# Patient Record
Sex: FEMALE | Race: WHITE | NOT HISPANIC OR LATINO | Employment: OTHER
[De-identification: names, ages, dates, MRNs, and addresses within clinical notes are randomized per-mention and may not be internally consistent; named-entity substitution may affect disease eponyms.]

---

## 2023-11-01 ENCOUNTER — TRANSCRIBE ORDERS (OUTPATIENT)
Dept: OTHER | Age: 78
End: 2023-11-01

## 2023-11-01 DIAGNOSIS — L98.9 SKIN LESION: Primary | ICD-10-CM

## 2024-01-06 ENCOUNTER — HEALTH MAINTENANCE LETTER (OUTPATIENT)
Age: 79
End: 2024-01-06

## 2025-02-27 ENCOUNTER — TRANSCRIBE ORDERS (OUTPATIENT)
Dept: OTHER | Age: 80
End: 2025-02-27

## 2025-02-27 DIAGNOSIS — M25.50 POLYARTHRALGIA: Primary | ICD-10-CM

## 2025-03-28 PROBLEM — N81.6 CYSTOCELE WITH RECTOCELE: Status: ACTIVE | Noted: 2025-03-28

## 2025-03-28 PROBLEM — N81.10 CYSTOCELE WITH RECTOCELE: Status: ACTIVE | Noted: 2025-03-28

## 2025-06-24 NOTE — PROGRESS NOTES
Rheumatology Clinic Visit  [facility]  DEBBIE Osullivan     Bernice Mejia MRN# [id number]   YOB: 1945 Age: 79 year old   Date of Visit: 7/2/2025  Primary care provider: Lisy Case          Assessment and Plan:     1.  Polyarthralgia  2.  Primary osteoarthritis of both hands    Patient presents today for an initial evaluation.  She states that she was diagnosed with osteoarthritis many years ago.  She has been on meloxicam and has been well-controlled with that.  1 year ago in April she fell and tore her rotator cuff.  She was doing physical therapy to help.  Unfortunately after that time the meloxicam was no longer effective.  She has tried other NSAIDs such as Celebrex or piroxicam without significant relief.  She has been given prednisone which has been significantly helpful.  Yesterday she got a cortisone injection into her left shoulder and states that today she woke up without any swelling in her hands.  Physical examination today does show significant Shannon and [place]berden nodes.    Reviewed the specialty of rheumatology today.  We discussed the differences between inflammatory and noninflammatory arthritis.  She certainly has significant osteoarthritis with pretty significant changes to her joints from it.  X-rays do not show any inflammatory arthritic changes but we will do further evaluation.  Will check some blood work today.  May want to consider getting an MRI.  If everything returns normal/negative I did discuss with the patient that she could discuss Cymbalta with her primary care provider.  I will have the patient called once the results of today's workup is complete.    Plan:     Schedule follow-up with Harini Tellez PA-C depending on results of today's work up  Imaging: consider MRI of one of your joints  Labs: CCP antibody, Rheumatoid factor, CRP and Sed Rate    DEBBIE Osullivan  Rheumatology         History of Present Illness:   Bernice Mejia presents for  evaluation of joint pain.  Past medical history includes cystocele with rectocele, hyperlipidemia, hypertension and urinary incontinence.    In 1997 she was diagnosed with OA. She has been on medications over the year. 1 year ago in April she fell and tore in her rotator cuff. She started PT to help. She states that she had been on Meloxicam but it was not working, which before it worked great. She tried Prednisone. Nothing seemed to help besides the Prednisone (things tried included celebrex and piroxicam). She was given some Prednisone, ended 3 days ago. She was waking up with her hands swollen and sore. Her shoulders hurt during the night. Soreness in her feet. H/o knee replacement but still having some aching. She got a cortisone injection the left shoulder yesterday and woke up without any swollen hands. No skin rashes or mouth sores. No history of blood clots or seizures. No hair loss.  No significant dry eyes or dry mouth. No history of delirium or psychosis. No raynaud's. No trouble with swallowing food/medications.     Mother and mother with lupus.          Review of Systems:     Constitutional: negative  Skin: negative  Eyes: negative  Ears/Nose/Throat: negative  Respiratory: No shortness of breath, dyspnea on exertion, cough, or hemoptysis  Cardiovascular: negative  Gastrointestinal: negative  Genitourinary: negative  Musculoskeletal: as above  Neurologic: negative  Psychiatric: negative  Hematologic/Lymphatic/Immunologic: negative  Endocrine: negative         Active Problem List:     Patient Active Problem List    Diagnosis Date Noted    Cystocele with rectocele 03/28/2025     Priority: Medium    Hyperlipidemia 02/03/2015     Priority: Medium     From a visit with Lisy GORDON on 3/19/14--  A/p: (272.4) Other and unspecified hyperlipidemia (primary encounter diagnosis)   Comment:   Plan: due for labs, refill. Will contact with lab results when available. . Continue with low fat, high fiber diet.  Regular exercise, weight loss discussed. Continue simvastatin      Enthesopathy of hip region 09/05/2007     Priority: Medium    Osteoarthritis 08/01/2007     Priority: Medium     right-waiting for replacement      Arthropathy 08/01/2007     Priority: Medium    Essential hypertension 08/01/2007     Priority: Medium    Urinary incontinence 08/01/2007     Priority: Medium            Past Medical History:   No past medical history on file.  No past surgical history on file.         Social History:     Social History     Socioeconomic History    Marital status:      Spouse name: Not on file    Number of children: Not on file    Years of education: Not on file    Highest education level: Not on file   Occupational History    Not on file   Tobacco Use    Smoking status: Never    Smokeless tobacco: Not on file   Substance and Sexual Activity    Alcohol use: Not on file    Drug use: Not on file    Sexual activity: Not on file   Other Topics Concern    Not on file   Social History Narrative    Not on file     Social Drivers of Health     Financial Resource Strain: High Risk (1/1/2022)    Received from [facility]    Financial Resource Strain     Difficulty of Paying Living Expenses: Not on file     Difficulty of Paying Living Expenses: Not on file   Food Insecurity: Not on file   Transportation Needs: Not on file   Physical Activity: Not on file   Stress: Not on file   Social Connections: Not on file   Interpersonal Safety: Not on file   Housing Stability: Not on file          Family History:     Family History   Problem Relation Age of Onset    Lupus Mother     Diabetes Mother     Hypertension Mother     Heart Failure Mother     Diabetes Father     Heart Disease Father             Allergies:     Allergies   Allergen Reactions    Duloxetine Hcl Diarrhea and GI Disturbance    Lisinopril Cough    Quinine     Sulfa Antibiotics     Thiazide-Type Diuretics             Medications:      Current Outpatient Medications   Medication Sig Dispense Refill    Acetaminophen (TYLENOL PO) Take 325 mg by mouth every 6 hours as needed for mild pain or fever (Patient taking differently: Take 650 mg by mouth every 8 hours as needed for mild pain.)      calcium citrate-vitamin D (CITRACAL) 315-200 MG-UNIT TABS Take 1 tablet by mouth daily      Cholecalciferol (VITAMIN D3 PO) Take 2,000 Units by mouth daily      clotrimazole-betamethasone (LOTRISONE) 1-0.05 % external cream Apply topically 2 times daily.      diclofenac (VOLTAREN) 1 % topical gel Apply topically 4 times daily as needed.      estradiol (ESTRACE) 0.1 MG/GM vaginal cream Place 1 g vaginally twice a week.      glucosamine-chondroitin 500-400 MG CAPS Take 1 capsule by mouth 2 times daily.      HEMP OIL OR EXTRACT OR OTHER CBD CANNABINOID, NOT MEDICAL CANNABIS, as needed (for pain).      losartan (COZAAR) 25 MG tablet Take 75 mg by mouth daily.      MAGNESIUM PO Take 1 tablet by mouth daily as needed. (Patient taking differently: Take 250 mg by mouth daily. Magnesium citrate 250mg daily, and magnesia phospherica 6x HPUS as needed for leg cramps)      meloxicam (MOBIC) 15 MG tablet Take 15 mg by mouth daily.      metoprolol succinate ER (TOPROL XL) 50 MG 24 hr tablet Take 50 mg by mouth daily.      multivitamin, therapeutic (THERA-VIT) TABS Take 1 tablet by mouth daily      Omega-3 Fatty Acids (OMEGA-3 FISH OIL PO) Take 1 g by mouth      Pseudoephedrine HCl (SUDAFED PO) Take 60 mg by mouth every 6 hours as needed for congestion      SIMVASTATIN PO Take 40 mg by mouth At Bedtime      sodium chloride (OCEAN) 0.65 % nasal spray Spray 1 spray into both nostrils daily as needed for congestion      trimethoprim (TRIMPEX) 100 MG tablet Take 100 mg by mouth daily.      Turmeric 400 MG CAPS Take by mouth daily.              Physical Exam:   Blood pressure (!) 151/68, pulse 92, temperature 98  F (36.7  C), temperature source Tympanic, resp. rate 12, height  "1.626 m (5' 4\"), weight 87.1 kg (192 lb), SpO2 94%.  Wt Readings from Last 6 Encounters:   07/20/15 97.1 kg (214 lb)     Constitutional: well-developed, appearing stated age; cooperative  Eyes: nl PERRLA, conjunctiva, sclera  ENT: nl external ears, nose, hearing, lips, teeth, gums, throat. No mucositis.   No mucous membrane lesions, normal saliva pool  Neck: no mass or thyroid enlargement  Resp: No shortness of breath with normal conversation  Lymph: no cervical, supraclavicular or epitrochlear nodes  MS: No active synovitis or dactylitis.  Significantly within Shannon nodes on multiple DIPs and PIPs.  Squaring of first CMC's bilaterally.  Skin: no nail pitting, alopecia, rash, nodules or lesions.   Psych: nl judgement, orientation, memory, affect.           Data:   Imaging:  XR Hand Right G/E 3 Views  Impression  Polyarticular osteoarthritis, as above        Dictated by Romel Lin MD @ 2/6/2025 3:45:08 PM    (Electronically Signed)  Narrative    For Patients:  As a result of the 21st Century Cures Act, medical imaging exams and procedure reports are released immediately into your electronic medical record.  You may view this report before your referring provider.  If you have questions, please contact your health care provider.      INDICATION:  Right hand pain    TECHNIQUE:  Three views of the right hand    COMPARISON:  None    FINDINGS:  Advanced osteoarthritis at the 1st CMC joint and most of the IP joints. Moderate to advanced 1st MCP joint osteoarthritis and mild osteoarthritis of the 2nd and 3rd. Mild to moderate osteoarthritis at the triscaphe joint. No erosive change or chondrocalcinosis.    XR Pelvis and Hip Left 1 View  Order: [id number]  Impression    Moderate degenerative changes left hip.        Dictated by Nabil Nelson MD @ 1/10/2025 9:23:53 AM    (Electronically Signed)  Narrative    For Patients:  As a result of the 21st Century Cures Act, medical imaging exams and procedure reports are released " immediately into your electronic medical record.  You may view this report before your referring provider.  If you have questions, please contact your health care provider.      INDICATION:  Left hip pain    TECHNIQUE:  Two-view    COMPARISON:  None    FINDINGS:  Bones: Alignment is normal. No fractures or bone lesions.    Joint spaces: Moderate advanced degenerative changes both hips, greater on the right. Mild degenerative changes both SI joints. Moderate lower lumbar spondylosis.  Soft tissues: Unremarkable.      XR Shoulder Left G/E 3 Views  Order: [id number]  Impression    Moderate glenohumeral joint osteoarthritis        Dictated by Romel Lin MD @ 2/6/2025 3:46:14 PM    (Electronically Signed)  Narrative    For Patients:  As a result of the 21st Century Cures Act, medical imaging exams and procedure reports are released immediately into your electronic medical record.  You may view this report before your referring provider.  If you have questions, please contact your health care provider.      INDICATION:  Chronic left shoulder pain    TECHNIQUE:  Three views of the left shoulder    COMPARISON:  None    FINDINGS:  Moderate glenohumeral joint osteoarthritis. AC joint unremarkable. No erosive change or abnormal soft tissue calcification.    XR KNEE WB 1 VIEW AP BILATERAL AND 3 VIEWS BILATERAL  Order: [id number]  Narrative    For Patients:  As a result of the 21st Century Cures Act, medical imaging exams and procedure reports are released immediately into your electronic medical record.  You may view this report before your referring provider.  If you have questions, please contact your health care provider.      Indication:  Pain    Technique:  Weight-bearing three view study was performed of each knee.    Comparison:  A right knee study of April 19, 2024    Findings:  As described below    Impression:    1. Bone mineral density is globally decreased.  2. There is no acute fracture, dislocation or destructive  process.  3. There have been bilateral knee arthroplasties. Alignment appears normal. The prosthetic components appear intact by plain film. There is no significant abnormal Roxana implant lucency.  4. Small joint effusions. Mild soft tissue swelling.    Laboratory:  none

## 2025-07-02 ENCOUNTER — OFFICE VISIT (OUTPATIENT)
Dept: RHEUMATOLOGY | Facility: CLINIC | Age: 80
End: 2025-07-02
Attending: PHYSICIAN ASSISTANT
Payer: MEDICARE

## 2025-07-02 VITALS
HEART RATE: 92 BPM | RESPIRATION RATE: 12 BRPM | WEIGHT: 192 LBS | OXYGEN SATURATION: 94 % | BODY MASS INDEX: 32.78 KG/M2 | TEMPERATURE: 98 F | DIASTOLIC BLOOD PRESSURE: 68 MMHG | SYSTOLIC BLOOD PRESSURE: 151 MMHG | HEIGHT: 64 IN

## 2025-07-02 DIAGNOSIS — M19.041 PRIMARY OSTEOARTHRITIS OF BOTH HANDS: ICD-10-CM

## 2025-07-02 DIAGNOSIS — M25.50 POLYARTHRALGIA: Primary | ICD-10-CM

## 2025-07-02 DIAGNOSIS — M19.042 PRIMARY OSTEOARTHRITIS OF BOTH HANDS: ICD-10-CM

## 2025-07-02 LAB
CRP SERPL-MCNC: 39.94 MG/L
ERYTHROCYTE [SEDIMENTATION RATE] IN BLOOD BY WESTERGREN METHOD: 33 MM/HR (ref 0–30)
RHEUMATOID FACT SERPL-ACNC: 380 IU/ML

## 2025-07-02 PROCEDURE — 86431 RHEUMATOID FACTOR QUANT: CPT | Performed by: PHYSICIAN ASSISTANT

## 2025-07-02 PROCEDURE — 85652 RBC SED RATE AUTOMATED: CPT | Performed by: PHYSICIAN ASSISTANT

## 2025-07-02 PROCEDURE — 3077F SYST BP >= 140 MM HG: CPT | Performed by: PHYSICIAN ASSISTANT

## 2025-07-02 PROCEDURE — 3078F DIAST BP <80 MM HG: CPT | Performed by: PHYSICIAN ASSISTANT

## 2025-07-02 PROCEDURE — 36415 COLL VENOUS BLD VENIPUNCTURE: CPT | Performed by: PHYSICIAN ASSISTANT

## 2025-07-02 PROCEDURE — 1125F AMNT PAIN NOTED PAIN PRSNT: CPT | Performed by: PHYSICIAN ASSISTANT

## 2025-07-02 PROCEDURE — 99204 OFFICE O/P NEW MOD 45 MIN: CPT | Performed by: PHYSICIAN ASSISTANT

## 2025-07-02 PROCEDURE — 86140 C-REACTIVE PROTEIN: CPT | Performed by: PHYSICIAN ASSISTANT

## 2025-07-02 RX ORDER — TURMERIC 400 MG
CAPSULE ORAL DAILY
COMMUNITY
Start: 2025-04-17

## 2025-07-02 RX ORDER — PIROXICAM 10 MG/1
10 CAPSULE ORAL 2 TIMES DAILY
COMMUNITY
Start: 2025-05-05 | End: 2025-07-02

## 2025-07-02 ASSESSMENT — PAIN SCALES - GENERAL: PAINLEVEL_OUTOF10: MILD PAIN (1)

## 2025-07-02 NOTE — PATIENT INSTRUCTIONS
After Visit Instructions:     Thank you for coming to [facility] for your care. It is my goal to partner with you to help you reach your optimal state of health.       Plan:     Schedule follow-up with Harini Tellez PA-C depending on results of today's work up  Imaging: consider MRI of one of your joints  Labs: CCP antibody, Rheumatoid factor, CRP and Sed Rate    Harini Tellez PA-C  [facility]  [facility]    Contact information: [facility]  Clinic Number:  [phone]  Please call or send a [employer] message with any questions about your care

## 2025-07-14 NOTE — PROGRESS NOTES
"Rheumatology Clinic Visit  [facility]  HariniDEBBIE Oconnell     Bernice Mejia MRN# [id number]   YOB: 1945 Age: 80 year old   Date of Visit: 7/16/2025  Primary care provider: Lisy Case          Assessment and Plan:     1.  Seropositive rheumatoid arthritis  2.  High-risk medication use    Patient presents today for follow-up.  Discussed the results of her blood work.  Discussed the diagnosis of seropositive rheumatoid arthritis.  At this time I would recommend treatment with immunomodulatory therapy.  We discussed different options today for medication including methotrexate, sulfasalazine and hydroxychloroquine. After further discussion we elected to start her on Methotrexate.  Of note there is a potential drug interaction between methotrexate and her trimethoprim.  She takes this for UTI prevention.      Per up-to-date \"A number of case reports describe the development of methotrexate-associated toxicity (eg, bone marrow suppression, at times fatal) in patients taking as little as 10 mg of methotrexate weekly and trimethoprim-sulfamethoxazole (TMP-SMX).1,2,3,4,5,6,7,8,9,10,11,12,13,14 Additionally, results of a practitioner survey identified concomitant treatment with TMP-SMX as a possible risk factor for methotrexate-associated pancytopenia.15    The mechanism of the interaction is unclear. TMP-SMX may increase the concentration of free (unbound) methotrexate by approximately 30% and reduce its excretion by approximately one-half.16 However, two studies failed to demonstrate any pharmacokinetic interaction,17,18 and in one study no increased risk of hematologic toxicity was found with the combination.18 \"    Given this potential risk, we will check her labs 2 weeks after she starts the methotrexate and again 4 weeks after starting the methotrexate.  If there is any cytopenias noted, we will discontinue the methotrexate.  Patient verbalized understanding.  Will get baseline labs today.  " She will follow-up with me in 3 months     The longitudinal plan of care for the diagnosis(es)/condition(s) as documented were addressed during this visit. Due to the added complexity in care, I will continue to support Bernice in the subsequent management and with ongoing continuity of care.    Plan:     Schedule follow-up with Harini Tellez PA-C in 3 months.   Labs: CBC, creatinine, Albumin, AST, ALT, CRP and Sed Rate today, in 1 month and then every 3 months  Medication recommendations:   Methotrexate: Will start you on 10mg (4 tablets) every 7 days for 2 weeks followed by 15 mg (6 tablets) every 7 days. Take all tablets within the same 24 hours. While on Methotrexate:  -check labs (ALT/AST, albumin, CBC with platelets and creatinine) every 3 months  -Limit alcohol to 2 drinks weekly  -Take Folic Acid 1mg daily   -Tylenol 500-1000mg can be used as needed up to three times daily for nausea/headache associated with dosing  # Methotrexate Risks and Benefits: The risks and benefits of methotrexate were discussed in detail and the patient verbalized understanding.  The risks discussed include, but are not limited to, the risk for hypersensitivity, anaphylaxis, anaphylactoid reactions, infections, bone marrow suppression, renal toxicity, hepatotoxicity, pulmonary toxicity, malignancy, impaired fertility, GI upset, alopecia, and oral and nasal sores.  Folic acid supplementation is recommended during methotrexate therapy to help prevent some of the side effects. Pregnancy prevention and planning was discussed; it is recommended that women of childbearing potential use reliable contraception during therapy.  The risks of taking both methotrexate and alcohol were reviewed; complete alcohol avoidance was discussed.  Routine laboratory monitoring is required during methotrexate therapy. Taking MTX once weekly, all within a 24 hour period was stressed and the patient verbalized this instruction back to me.  I encouraged  reviewing the package insert and asking any questions about the medication.     DEBBIE Osullivan  Rheumatology         History of Present Illness:   Bernice Mejia presents for evaluation of joint pain.  Past medical history includes cystocele with rectocele, hyperlipidemia, hypertension and urinary incontinence.    Interval history July 16, 2025:  Here to discuss lab results and treatment plan    HPI from consult of July 2, 2025:  In 1997 she was diagnosed with OA. She has been on medications over the year. 1 year ago in April she fell and tore in her rotator cuff. She started PT to help. She states that she had been on Meloxicam but it was not working, which before it worked great. She tried Prednisone. Nothing seemed to help besides the Prednisone (things tried included celebrex and piroxicam). She was given some Prednisone, ended 3 days ago. She was waking up with her hands swollen and sore. Her shoulders hurt during the night. Soreness in her feet. H/o knee replacement but still having some aching. She got a cortisone injection the left shoulder yesterday and woke up without any swollen hands. No skin rashes or mouth sores. No history of blood clots or seizures. No hair loss.  No significant dry eyes or dry mouth. No history of delirium or psychosis. No raynaud's. No trouble with swallowing food/medications.     Mother and mother with lupus.          Review of Systems:     Constitutional: negative  Skin: negative  Eyes: negative  Ears/Nose/Throat: negative  Respiratory: No shortness of breath, dyspnea on exertion, cough, or hemoptysis  Cardiovascular: negative  Gastrointestinal: negative  Genitourinary: negative  Musculoskeletal: as above  Neurologic: negative  Psychiatric: negative  Hematologic/Lymphatic/Immunologic: negative  Endocrine: negative         Active Problem List:     Patient Active Problem List    Diagnosis Date Noted    Cystocele with rectocele 03/28/2025     Priority: Medium    Hyperlipidemia  02/03/2015     Priority: Medium     From a visit with Lisy GORDON on 3/19/14--  A/p: (272.4) Other and unspecified hyperlipidemia (primary encounter diagnosis)   Comment:   Plan: due for labs, refill. Will contact with lab results when available. . Continue with low fat, high fiber diet. Regular exercise, weight loss discussed. Continue simvastatin      Enthesopathy of hip region 09/05/2007     Priority: Medium    Osteoarthritis 08/01/2007     Priority: Medium     right-waiting for replacement      Arthropathy 08/01/2007     Priority: Medium    Essential hypertension 08/01/2007     Priority: Medium    Urinary incontinence 08/01/2007     Priority: Medium            Past Medical History:   History reviewed. No pertinent past medical history.  History reviewed. No pertinent surgical history.         Social History:     Social History     Socioeconomic History    Marital status:      Spouse name: Not on file    Number of children: Not on file    Years of education: Not on file    Highest education level: Not on file   Occupational History    Not on file   Tobacco Use    Smoking status: Never     Passive exposure: Past    Smokeless tobacco: Not on file   Vaping Use    Vaping status: Never Used   Substance and Sexual Activity    Alcohol use: Not Currently    Drug use: Yes     Types: Marijuana     Comment: CBD gummies as needed for pain    Sexual activity: Not on file   Other Topics Concern    Not on file   Social History Narrative    Not on file     Social Drivers of Health     Financial Resource Strain: High Risk (1/1/2022)    [facility] [employer] [facility]    Financial Resource Strain     Difficulty of Paying Living Expenses: Not on file     Difficulty of Paying Living Expenses: Not on file   Food Insecurity: Not on file   Transportation Needs: Not on file   Physical Activity: Not on file   Stress: Not on file   Social Connections: Not on file   Interpersonal Safety: Not on file    Housing Stability: Not on file          Family History:     Family History   Problem Relation Age of Onset    Lupus Mother     Diabetes Mother     Hypertension Mother     Heart Failure Mother     Diabetes Father     Heart Disease Father             Allergies:     Allergies   Allergen Reactions    Duloxetine Hcl Diarrhea and GI Disturbance    Lisinopril Cough    Quinine     Sulfa Antibiotics     Thiazide-Type Diuretics             Medications:     Current Outpatient Medications   Medication Sig Dispense Refill    Acetaminophen (TYLENOL PO) Take 325 mg by mouth every 6 hours as needed for mild pain or fever      calcium citrate-vitamin D (CITRACAL) 315-200 MG-UNIT TABS Take 1 tablet by mouth daily      Cholecalciferol (VITAMIN D3 PO) Take 2,000 Units by mouth daily      clotrimazole-betamethasone (LOTRISONE) 1-0.05 % external cream Apply topically 2 times daily. (Patient taking differently: Apply topically 2 times daily as needed.)      diclofenac (VOLTAREN) 1 % topical gel Apply topically 4 times daily as needed.      estradiol (ESTRACE) 0.1 MG/GM vaginal cream Place 1 g vaginally twice a week.      folic acid (FOLVITE) 1 MG tablet Take 1 tablet (1 mg) by mouth daily. 90 tablet 3    glucosamine-chondroitin 500-400 MG CAPS Take 1 capsule by mouth 2 times daily.      HEMP OIL OR EXTRACT OR OTHER CBD CANNABINOID, NOT MEDICAL CANNABIS, as needed (for pain).      losartan (COZAAR) 25 MG tablet Take 75 mg by mouth daily.      MAGNESIUM PO Take 1 tablet by mouth daily as needed. (Patient taking differently: Take 250 mg by mouth daily. Magnesium citrate 250mg daily, and magnesia phospherica 6x HPUS as needed for leg cramps)      methotrexate 2.5 MG tablet Take 4 tabs every 7 days for 2 weeks, then increase to 6 tabs every 7 days. Labs every 8 - 12 weeks for refills. 72 tablet 0    metoprolol succinate ER (TOPROL XL) 50 MG 24 hr tablet Take 50 mg by mouth daily.      multivitamin, therapeutic (THERA-VIT) TABS Take 1 tablet  "by mouth daily      Omega-3 Fatty Acids (OMEGA-3 FISH OIL PO) Take 1 g by mouth      predniSONE (DELTASONE) 5 MG tablet Take 2 tablets (10 mg) by mouth daily for 7 days, THEN 1.5 tablets (7.5 mg) daily for 7 days, THEN 1 tablet (5 mg) daily for 7 days. 32 tablet 0    Pseudoephedrine HCl (SUDAFED PO) Take 60 mg by mouth every 6 hours as needed for congestion      SIMVASTATIN PO Take 40 mg by mouth At Bedtime      sodium chloride (OCEAN) 0.65 % nasal spray Spray 1 spray into both nostrils daily as needed for congestion      trimethoprim (TRIMPEX) 100 MG tablet Take 100 mg by mouth daily.      Turmeric 400 MG CAPS Take by mouth daily.      meloxicam (MOBIC) 15 MG tablet Take 15 mg by mouth daily. (Patient not taking: Reported on 7/16/2025)              Physical Exam:   Blood pressure 126/82, pulse 90, temperature 96.8  F (36  C), temperature source Tympanic, resp. rate 12, height 1.626 m (5' 4\"), weight 87.1 kg (192 lb), SpO2 95%.  Wt Readings from Last 6 Encounters:   07/16/25 87.1 kg (192 lb)   07/02/25 87.1 kg (192 lb)   07/20/15 97.1 kg (214 lb)     Constitutional: well-developed, appearing stated age; cooperative  Eyes: nl conjunctiva, sclera  ENT: nl external ears, nose, hearing, lips,   Neck: no mass or thyroid enlargement  Resp: No shortness of breath with normal conversation  Psych: nl judgement, orientation, memory, affect.           Data:   Imaging:  XR Hand Right G/E 3 Views  Impression  Polyarticular osteoarthritis, as above        Dictated by Romel Lin MD @ 2/6/2025 3:45:08 PM    (Electronically Signed)  Narrative    For Patients:  As a result of the 21st Century Cures Act, medical imaging exams and procedure reports are released immediately into your electronic medical record.  You may view this report before your referring provider.  If you have questions, please contact your health care provider.      INDICATION:  Right hand pain    TECHNIQUE:  Three views of the right " hand    COMPARISON:  None    FINDINGS:  Advanced osteoarthritis at the 1st CMC joint and most of the IP joints. Moderate to advanced 1st MCP joint osteoarthritis and mild osteoarthritis of the 2nd and 3rd. Mild to moderate osteoarthritis at the triscaphe joint. No erosive change or chondrocalcinosis.    XR Pelvis and Hip Left 1 View  Order: [id number]  Impression    Moderate degenerative changes left hip.        Dictated by Nabil Nelson MD @ 1/10/2025 9:23:53 AM    (Electronically Signed)  Narrative    For Patients:  As a result of the 21st Century Cures Act, medical imaging exams and procedure reports are released immediately into your electronic medical record.  You may view this report before your referring provider.  If you have questions, please contact your health care provider.      INDICATION:  Left hip pain    TECHNIQUE:  Two-view    COMPARISON:  None    FINDINGS:  Bones: Alignment is normal. No fractures or bone lesions.    Joint spaces: Moderate advanced degenerative changes both hips, greater on the right. Mild degenerative changes both SI joints. Moderate lower lumbar spondylosis.  Soft tissues: Unremarkable.      XR Shoulder Left G/E 3 Views  Order: [id number]  Impression    Moderate glenohumeral joint osteoarthritis        Dictated by Romel Lin MD @ 2/6/2025 3:46:14 PM    (Electronically Signed)  Narrative    For Patients:  As a result of the 21st Century Cures Act, medical imaging exams and procedure reports are released immediately into your electronic medical record.  You may view this report before your referring provider.  If you have questions, please contact your health care provider.      INDICATION:  Chronic left shoulder pain    TECHNIQUE:  Three views of the left shoulder    COMPARISON:  None    FINDINGS:  Moderate glenohumeral joint osteoarthritis. AC joint unremarkable. No erosive change or abnormal soft tissue calcification.    XR KNEE WB 1 VIEW AP BILATERAL AND 3 VIEWS  BILATERAL  Order: [id number]  Narrative    For Patients:  As a result of the 21st Century Cures Act, medical imaging exams and procedure reports are released immediately into your electronic medical record.  You may view this report before your referring provider.  If you have questions, please contact your health care provider.      Indication:  Pain    Technique:  Weight-bearing three view study was performed of each knee.    Comparison:  A right knee study of April 19, 2024    Findings:  As described below    Impression:    1. Bone mineral density is globally decreased.  2. There is no acute fracture, dislocation or destructive process.  3. There have been bilateral knee arthroplasties. Alignment appears normal. The prosthetic components appear intact by plain film. There is no significant abnormal Roxana implant lucency.  4. Small joint effusions. Mild soft tissue swelling.    Laboratory:  7/2/2025  CRP 39.94  CCP antibody 132  Rheumatoid factor   Sed rate 33

## 2025-07-16 ENCOUNTER — OFFICE VISIT (OUTPATIENT)
Dept: RHEUMATOLOGY | Facility: CLINIC | Age: 80
End: 2025-07-16
Payer: COMMERCIAL

## 2025-07-16 VITALS
TEMPERATURE: 96.8 F | OXYGEN SATURATION: 95 % | SYSTOLIC BLOOD PRESSURE: 126 MMHG | BODY MASS INDEX: 32.78 KG/M2 | HEART RATE: 90 BPM | DIASTOLIC BLOOD PRESSURE: 82 MMHG | WEIGHT: 192 LBS | RESPIRATION RATE: 12 BRPM | HEIGHT: 64 IN

## 2025-07-16 DIAGNOSIS — M05.9 SEROPOSITIVE RHEUMATOID ARTHRITIS (H): Primary | ICD-10-CM

## 2025-07-16 DIAGNOSIS — Z79.899 HIGH RISK MEDICATION USE: ICD-10-CM

## 2025-07-16 LAB
ALBUMIN SERPL BCG-MCNC: 4.2 G/DL (ref 3.5–5.2)
ALT SERPL W P-5'-P-CCNC: 20 U/L (ref 0–50)
AST SERPL W P-5'-P-CCNC: 19 U/L (ref 0–45)
CREAT SERPL-MCNC: 1.29 MG/DL (ref 0.51–0.95)
EGFRCR SERPLBLD CKD-EPI 2021: 42 ML/MIN/1.73M2
ERYTHROCYTE [DISTWIDTH] IN BLOOD BY AUTOMATED COUNT: 15.3 % (ref 10–15)
HCT VFR BLD AUTO: 40.3 % (ref 35–47)
HGB BLD-MCNC: 13 G/DL (ref 11.7–15.7)
MCH RBC QN AUTO: 29.5 PG (ref 26.5–33)
MCHC RBC AUTO-ENTMCNC: 32.3 G/DL (ref 31.5–36.5)
MCV RBC AUTO: 92 FL (ref 78–100)
PLATELET # BLD AUTO: 255 10E3/UL (ref 150–450)
RBC # BLD AUTO: 4.4 10E6/UL (ref 3.8–5.2)
WBC # BLD AUTO: 11.5 10E3/UL (ref 4–11)

## 2025-07-16 PROCEDURE — 99214 OFFICE O/P EST MOD 30 MIN: CPT | Performed by: PHYSICIAN ASSISTANT

## 2025-07-16 PROCEDURE — 36415 COLL VENOUS BLD VENIPUNCTURE: CPT | Performed by: PHYSICIAN ASSISTANT

## 2025-07-16 PROCEDURE — 3074F SYST BP LT 130 MM HG: CPT | Performed by: PHYSICIAN ASSISTANT

## 2025-07-16 PROCEDURE — 84460 ALANINE AMINO (ALT) (SGPT): CPT | Performed by: PHYSICIAN ASSISTANT

## 2025-07-16 PROCEDURE — 3079F DIAST BP 80-89 MM HG: CPT | Performed by: PHYSICIAN ASSISTANT

## 2025-07-16 PROCEDURE — 85027 COMPLETE CBC AUTOMATED: CPT | Performed by: PHYSICIAN ASSISTANT

## 2025-07-16 PROCEDURE — G2211 COMPLEX E/M VISIT ADD ON: HCPCS | Performed by: PHYSICIAN ASSISTANT

## 2025-07-16 PROCEDURE — 84450 TRANSFERASE (AST) (SGOT): CPT | Performed by: PHYSICIAN ASSISTANT

## 2025-07-16 PROCEDURE — 82565 ASSAY OF CREATININE: CPT | Performed by: PHYSICIAN ASSISTANT

## 2025-07-16 PROCEDURE — 82040 ASSAY OF SERUM ALBUMIN: CPT | Performed by: PHYSICIAN ASSISTANT

## 2025-07-16 RX ORDER — METHOTREXATE 2.5 MG/1
TABLET ORAL
Qty: 72 TABLET | Refills: 0 | Status: SHIPPED | OUTPATIENT
Start: 2025-07-16

## 2025-07-16 RX ORDER — FOLIC ACID 1 MG/1
1 TABLET ORAL DAILY
Qty: 90 TABLET | Refills: 3 | Status: SHIPPED | OUTPATIENT
Start: 2025-07-16

## 2025-07-16 NOTE — NURSING NOTE
Standing/ future lab orders faxed to [facility], per patient request: [phone].  Transmission confirmed via Right Fax.

## 2025-07-16 NOTE — PATIENT INSTRUCTIONS
After Visit Instructions:     Thank you for coming to [facility] for your care. It is my goal to partner with you to help you reach your optimal state of health.       Plan:   Schedule follow-up with Harini Tellez PA-C in 3 months.   Labs: CBC, creatinine, Albumin, AST, ALT, CRP and Sed Rate today, in 1 month and then every 3 months  Medication recommendations:   Methotrexate: Will start you on 10mg (4 tablets) every 7 days for 2 weeks followed by 15 mg (6 tablets) every 7 days. Take all tablets within the same 24 hours. While on Methotrexate:  -check labs (ALT/AST, albumin, CBC with platelets and creatinine) every 3 months  -Limit alcohol to 2 drinks weekly  -Take Folic Acid 1mg daily   -Tylenol 500-1000mg can be used as needed up to three times daily for nausea/headache associated with dosing  # Methotrexate Risks and Benefits: The risks and benefits of methotrexate were discussed in detail and the patient verbalized understanding.  The risks discussed include, but are not limited to, the risk for hypersensitivity, anaphylaxis, anaphylactoid reactions, infections, bone marrow suppression, renal toxicity, hepatotoxicity, pulmonary toxicity, malignancy, impaired fertility, GI upset, alopecia, and oral and nasal sores.  Folic acid supplementation is recommended during methotrexate therapy to help prevent some of the side effects. Pregnancy prevention and planning was discussed; it is recommended that women of childbearing potential use reliable contraception during therapy.  The risks of taking both methotrexate and alcohol were reviewed; complete alcohol avoidance was discussed.  Routine laboratory monitoring is required during methotrexate therapy. Taking MTX once weekly, all within a 24 hour period was stressed and the patient verbalized this instruction back to me.  I encouraged reviewing the package insert and asking any questions about the medication.       Harini Tellez PA-C  [facility]  [facility]  [facility]    Contact information: [facility]  Clinic Number:  [phone]  Please call or send a Wami message with any questions about your care

## 2025-07-17 ENCOUNTER — RESULTS FOLLOW-UP (OUTPATIENT)
Dept: RHEUMATOLOGY | Facility: CLINIC | Age: 80
End: 2025-07-17
Payer: COMMERCIAL

## 2025-08-06 ENCOUNTER — TRANSFERRED RECORDS (OUTPATIENT)
Dept: HEALTH INFORMATION MANAGEMENT | Facility: CLINIC | Age: 80
End: 2025-08-06
Payer: MEDICARE

## 2025-08-06 LAB
ALT SERPL-CCNC: 18 IU/L
AST SERPL-CCNC: 21 IU/L (ref 15–46)
CREATININE (EXTERNAL): 0.67 MG/DL (ref 0.52–1.04)
GFR ESTIMATED (EXTERNAL): 90 ML/MIN/1.73M2

## 2025-08-12 ENCOUNTER — TELEPHONE (OUTPATIENT)
Dept: RHEUMATOLOGY | Facility: CLINIC | Age: 80
End: 2025-08-12
Payer: MEDICARE